# Patient Record
(demographics unavailable — no encounter records)

---

## 2024-10-14 NOTE — REASON FOR VISIT
PA Initiation    Medication: Procrit [id number] unit- pa pending  Insurance Company: [employer] Part D - Phone [phone] Fax [phone]  Pharmacy Filling the Rx: [facility] - [place], [place] - [facility] [address]  Filling Pharmacy Phone: [phone]  Filling Pharmacy Fax: [phone]  Start Date: 9/29/2020       [Initial Visit] : an initial visit for [FreeTextEntry2] : Neck and left shoulder pain

## 2024-10-14 NOTE — ADDENDUM
[FreeTextEntry1] : This note was written by Katt Camargo on 10/14/2024 acting solely as a scribe for Dr. Terrell Perez.   All medical record entries made by the Scribe were at my, Dr. Terrell Perez, direction and personally dictated by me on 10/14/2024. I have personally reviewed the chart and agree that the record accurately reflects my personal performance of the history, physical exam, assessment and plan.

## 2024-10-14 NOTE — PHYSICAL EXAM
[de-identified] : Left shoulder exam  Inspection: No malalignment, No defects, No atrophy Skin: No masses, No lesions Neck: Negative Spurling's, full ROM, mild pain with ROM AROM: FF to 170, abduction to 90, ER to 80, IR to upper lumbar Painful arc ROM: none Tenderness: no bicipital tenderness, no tenderness to the greater tuberosity/RTC insertion, no anterior shoulder/lesser tuberosity tenderness Strength: 5/5 ER, 5/5 IR in adduction, 5/5 supraspinatus testing, negative [place]'s test AC Joint: + ttp/pain with cross arm testing Biceps: Speed Negative, Yergusons Negative Impingement test: Negative Haines, Negative Neer  Stability: Stable Vasc: 2+ radial pulse Neuro: AIN, PIN, Ulnar nerve intact to motor Sensation: Intact to light touch throughout  [de-identified] : The following radiographs were ordered and read by me during this patients visit. I reviewed each radiograph in detail with the patient and discussed the findings as highlighted below.   3 views of the left shoulder were obtained, 10/14/2024, that show no acute fracture or dislocation. There is no glenohumeral and mild AC joint degenerative change seen. Type II acromion. There is no significant malalignment. No significant other obvious osseous abnormality, otherwise unremarkable.    3 views of the cervical spine were obtained, 10/14/2024, that show no acute fracture or dislocation. There is significant diffuse DDD C3-C7.

## 2024-10-14 NOTE — HISTORY OF PRESENT ILLNESS
[de-identified] : 57-year-old right-handed female who presents with a 5-6 year history of neck and left shoulder pain, which she previously attributed to a diagnosis of temporomandibular joint (TMJ) disorder. There is no reported history of trauma or injury. Recently, she has experienced swelling at the base of her neck. The pain initially started in the neck and has since radiated down the left arm, with some radiation to the right. She reports increased pain with lifting and when lying on her left arm. Additionally, she experiences occasional tingling and weakness in the left arm. Pmhx, Adhesive capsulitis.

## 2024-10-18 NOTE — HISTORY OF PRESENT ILLNESS
[de-identified] : Ms. Neena Zavala is a 57 year old female with a chief complaint of neck pain. Symptoms can be worse with activity, but she also has pain when lying down at night. She has not had any treatment for this. She was seen by Dr. Perez regarding her shoulder and was told her shoulder was fine.

## 2024-10-18 NOTE — HISTORY OF PRESENT ILLNESS
[de-identified] : Ms. Neena Zavala is a 57 year old female with a chief complaint of neck pain. Symptoms can be worse with activity, but she also has pain when lying down at night. She has not had any treatment for this. She was seen by Dr. Perez regarding her shoulder and was told her shoulder was fine.

## 2024-10-18 NOTE — PHYSICAL EXAM
[Ataxic] : not ataxic [de-identified] : Examination of the cervical spine reveals no midline or paraspinal tenderness to palpation. No cervical lymphadenopathy. Decreased range of motion with respect to flexion, extension, rotation, and lateral bending. Negative Spurlings. Negative Lhermitte's. Full range of motion bilateral shoulders without evidence of impingement. No instability of bilateral upper extremities.  Cranial nerves II through XII grossly intact. Intact sensation bilateral upper extremities. 5/5 deltoids biceps triceps wrist extensors wrist flexors finger flexors and hand intrinsics. 1+ biceps triceps and brachioradialis reflexes. Negative Austin's. 2+ radial pulse. Negative Tinel's over the cubital and carpal tunnel. No skin lesions on the right and left upper extremities. [de-identified] : Review of AP lateral cervical X-rays reveals spondylosis, no gross instability, and no aggressive lesions.

## 2024-10-18 NOTE — ADDENDUM
[FreeTextEntry1] : I, Terrell Shetty, documented this note as a scribe on behalf of Rajendra Granger MD on 10/18/2024.

## 2024-10-18 NOTE — DISCUSSION/SUMMARY
[de-identified] : We discussed further treatment options. She will try a course of physical therapy as well as a muscle relaxant at night. Follow up in 3-4 weeks' time, sooner if any changing or worsening symptoms. MRI if symptoms are not better or worsened at that time.

## 2024-10-18 NOTE — PHYSICAL EXAM
[Ataxic] : not ataxic [de-identified] : Examination of the cervical spine reveals no midline or paraspinal tenderness to palpation. No cervical lymphadenopathy. Decreased range of motion with respect to flexion, extension, rotation, and lateral bending. Negative Spurlings. Negative Lhermitte's. Full range of motion bilateral shoulders without evidence of impingement. No instability of bilateral upper extremities.  Cranial nerves II through XII grossly intact. Intact sensation bilateral upper extremities. 5/5 deltoids biceps triceps wrist extensors wrist flexors finger flexors and hand intrinsics. 1+ biceps triceps and brachioradialis reflexes. Negative Austin's. 2+ radial pulse. Negative Tinel's over the cubital and carpal tunnel. No skin lesions on the right and left upper extremities. [de-identified] : Review of AP lateral cervical X-rays reveals spondylosis, no gross instability, and no aggressive lesions.

## 2024-10-18 NOTE — DISCUSSION/SUMMARY
[de-identified] : We discussed further treatment options. She will try a course of physical therapy as well as a muscle relaxant at night. Follow up in 3-4 weeks' time, sooner if any changing or worsening symptoms. MRI if symptoms are not better or worsened at that time.

## 2024-10-18 NOTE — END OF VISIT
[FreeTextEntry3] : All medical record entries made by the Scribe were at my, Rajendra Granger MD, direction and personally dictated by me on 10/18/2024. I have reviewed the chart and agree that the record accurately reflects my personal performance of the history, physical exam, assessment and plan. I have also personally directed, reviewed, and agreed with the chart. [Time Spent: ___ minutes] : I have spent [unfilled] minutes of time on the encounter which excludes teaching and separately reported services.

## 2025-07-24 NOTE — PHYSICAL EXAM
[Chaperoned Physical Exam] : A chaperone was present in the examining room during all aspects of the physical examination. [Appropriately responsive] : appropriately responsive [Alert] : alert [No Acute Distress] : no acute distress [No Lymphadenopathy] : no lymphadenopathy [Regular Rate Rhythm] : regular rate rhythm [No Murmurs] : no murmurs [Clear to Auscultation B/L] : clear to auscultation bilaterally [Soft] : soft [Non-tender] : non-tender [Non-distended] : non-distended [No HSM] : No HSM [No Lesions] : no lesions [No Mass] : no mass [Oriented x3] : oriented x3 [Examination Of The Breasts] : a normal appearance [No Masses] : no breast masses were palpable [Labia Majora] : normal [Labia Minora] : normal [Normal] : normal [Uterine Adnexae] : normal [FreeTextEntry2] : Randolph Bran [FreeTextEntry1] : medical scribe [Vulvar Atrophy] : vulvar atrophy [Atrophy] : atrophy

## 2025-07-24 NOTE — HISTORY OF PRESENT ILLNESS
[No] : Patient does not have concerns regarding sex [FreeTextEntry1] : 2025. PIA ROSARIO 58 year old female  PM since  presents for annual gyn exam. Her last annual was 2023.   She feels well and offers no complaints. She denies VB. Denies hot flashes. No vaginal discharge or vaginitis symptoms. No urinary complaints. BM is normal per patient. She denies abdominal and pelvic pain.   Currently sexually active with . Denies sexual dysfunction. Had dryness in the past but now is not too bothersome.  had knee surgery, not active in a few months.  Denies changes in medical status, medications, serious illness, hospitalizations, and surgeries.   ObHx:  GynHx: Denies history of fibroids, abnl pap, STI, pelvic infection, breast issues, ovarian cysts PMH: Hypothyroidism, hx of concussion, anxiety, osteopenia L hip SHx: denies Med: levothyroxine 50mcg, claritin All: Levaquin, IV contrast, sulfur Soc: Rare alcohol use, no T/D. She is a teacher at [employer]. Fhx: GM - Breast ca. Mother - lung ca, onset age 72. Denies FHx of ovarian, uterine or colon cancer. PHQ9=0 [Mammogramdate] : 2/24 [TextBox_19] : BIRADS2 [BreastSonogramDate] : 2/24 [TextBox_25] : BIRADS2 [PapSmeardate] : 12/23 [TextBox_31] : NILM, HRHPV neg [BoneDensityDate] : 8/22 [TextBox_37] : osteopenia L hip, nml spine and R hip [TextBox_43] : Jg 8/22 nml per pt

## 2025-07-24 NOTE — PLAN
[FreeTextEntry1] : Health Maintenance: 58 year old female pt presents for annual gyn exam BSE taught Reviewed diet and exercise Breast and pelvic exam performed Pap/HPV conducted Rx given for mammogram and breast sonogram, due now Advised pt. to schedule initial colonoscopy, GI referrals given Advised to repeat ColoGuard in meantime Rx given, ColoGuard 8/22 nml per pt Pt sees PCP for hypothyroidism f/u  Osteopenia: Pt w/ osteopenia in L hip seen on DXA 8/22 DXA d/w pt, to be added on today Recommend adequate dietary calcium intake (1000 mg/day for those 19-49yo and 1200 mg/day in older women) and vitamin D supplementation (600 IU/day <71 yo, 800 IU/day 71 yo and older), & weight-bearing exercises (i.e. walking) for 30 min at least 3x weekly   RTO in 1 year for annual or PRN

## 2025-07-24 NOTE — END OF VISIT
[FreeTextEntry3] : This note was written by Randolph Bran on 07/24/2025 actively solely JODY Toribio M.D. All medical record entries made by the Scribe were at my, JODY Toribio M.D. direction and personally dictated by me on 07/24/2025. I have personally reviewed the chart and agree that the record reflects my personal performance of the history, physical exam, assessment, and plan.